# Patient Record
Sex: MALE | URBAN - METROPOLITAN AREA
[De-identification: names, ages, dates, MRNs, and addresses within clinical notes are randomized per-mention and may not be internally consistent; named-entity substitution may affect disease eponyms.]

---

## 2022-03-31 ENCOUNTER — OFFICE VISIT (OUTPATIENT)
Dept: URBAN - METROPOLITAN AREA CLINIC 56 | Facility: CLINIC | Age: 77
End: 2022-03-31
Payer: MEDICARE

## 2022-03-31 DIAGNOSIS — H25.13 AGE-RELATED NUCLEAR CATARACT, BILATERAL: Primary | ICD-10-CM

## 2022-03-31 DIAGNOSIS — H04.123 TEAR FILM INSUFFICIENCY OF BILATERAL LACRIMAL GLANDS: ICD-10-CM

## 2022-03-31 DIAGNOSIS — H17.823 PERIPHERAL OPACITY OF CORNEA, BILATERAL: ICD-10-CM

## 2022-03-31 PROCEDURE — 99204 OFFICE O/P NEW MOD 45 MIN: CPT | Performed by: OPHTHALMOLOGY

## 2022-03-31 PROCEDURE — 92134 CPTRZ OPH DX IMG PST SGM RTA: CPT | Performed by: OPHTHALMOLOGY

## 2022-03-31 ASSESSMENT — INTRAOCULAR PRESSURE
OS: 16
OD: 16

## 2022-03-31 ASSESSMENT — KERATOMETRY
OD: 43.37
OS: 43.33

## 2022-03-31 ASSESSMENT — VISUAL ACUITY
OD: 20/50
OS: 20/25

## 2022-03-31 NOTE — IMPRESSION/PLAN
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 Plan: Reviewed with patient, Patient will contact the office immediately  if notes any new  vision changes. 
See Cornea team

## 2022-03-31 NOTE — IMPRESSION/PLAN
Impression: Age-related nuclear cataract, bilateral: H25.13. Plan: Patient will continue to monitor vision. Patient will return for CEE or if notes any sudden changes in vision or loss of vision. 
See cornea team

## 2022-06-24 ENCOUNTER — OFFICE VISIT (OUTPATIENT)
Dept: URBAN - METROPOLITAN AREA CLINIC 24 | Facility: CLINIC | Age: 77
End: 2022-06-24
Payer: MEDICARE

## 2022-06-24 DIAGNOSIS — H25.13 AGE-RELATED NUCLEAR CATARACT, BILATERAL: ICD-10-CM

## 2022-06-24 DIAGNOSIS — H43.822 VITREOMACULAR TRACTION OF LT EYE: ICD-10-CM

## 2022-06-24 DIAGNOSIS — H18.423 BAND KERATOPATHY, BILATERAL: Primary | ICD-10-CM

## 2022-06-24 PROCEDURE — 92025 CPTRIZED CORNEAL TOPOGRAPHY: CPT | Performed by: OPHTHALMOLOGY

## 2022-06-24 PROCEDURE — 76514 ECHO EXAM OF EYE THICKNESS: CPT | Performed by: OPHTHALMOLOGY

## 2022-06-24 PROCEDURE — 99214 OFFICE O/P EST MOD 30 MIN: CPT | Performed by: OPHTHALMOLOGY

## 2022-06-24 RX ORDER — OFLOXACIN 3 MG/ML
0.3 % SOLUTION/ DROPS OPHTHALMIC
Qty: 5 | Refills: 1 | Status: ACTIVE
Start: 2022-06-24

## 2022-06-24 RX ORDER — PREDNISOLONE ACETATE 10 MG/ML
1 % SUSPENSION/ DROPS OPHTHALMIC
Qty: 5 | Refills: 1 | Status: ACTIVE
Start: 2022-06-24

## 2022-06-24 ASSESSMENT — INTRAOCULAR PRESSURE
OD: 18
OS: 22

## 2022-06-24 NOTE — IMPRESSION/PLAN
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  Plan: CTM  in general clinic, may limit vision after surgery

## 2022-06-24 NOTE — IMPRESSION/PLAN
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 Plan: Causing discomfort and decreased vision. I recommend EDTA chelation, a procedure which uses EDTA solution to dissolve the calcium deposits from the cornea. R/B/A/C discussed. Patient understands and would like to proceed with surgery.   Schedule EDTA chelation OD first, then OS, ERx sent

## 2022-06-24 NOTE — IMPRESSION/PLAN
Impression: Age-related nuclear cataract, bilateral: H25.13. Plan: ********Will address after EDTA chelation VMT OS - plus drops

## 2022-08-03 ENCOUNTER — OFFICE VISIT (OUTPATIENT)
Dept: URBAN - METROPOLITAN AREA CLINIC 44 | Facility: CLINIC | Age: 77
End: 2022-08-03
Payer: MEDICARE

## 2022-08-03 DIAGNOSIS — H18.423 BAND KERATOPATHY, BILATERAL: Primary | ICD-10-CM

## 2022-08-03 PROCEDURE — 65436 CURETTE/TREAT CORNEA: CPT | Performed by: OPHTHALMOLOGY

## 2022-08-03 RX ORDER — ACETAMINOPHEN AND CODEINE PHOSPHATE 300; 30 MG/1; MG/1
TABLET ORAL
Qty: 10 | Refills: 0 | Status: ACTIVE
Start: 2022-08-03

## 2022-08-08 ENCOUNTER — OFFICE VISIT (OUTPATIENT)
Dept: URBAN - METROPOLITAN AREA CLINIC 44 | Facility: CLINIC | Age: 77
End: 2022-08-08
Payer: MEDICARE

## 2022-08-08 DIAGNOSIS — H18.423 BAND KERATOPATHY, BILATERAL: Primary | ICD-10-CM

## 2022-08-08 DIAGNOSIS — H25.13 AGE-RELATED NUCLEAR CATARACT, BILATERAL: ICD-10-CM

## 2022-08-08 PROCEDURE — 99024 POSTOP FOLLOW-UP VISIT: CPT | Performed by: OPHTHALMOLOGY

## 2022-08-08 NOTE — IMPRESSION/PLAN
Impression: Band keratopathy, bilateral: Q89.054. Plan: s/p EDTA chelation OD, well healed, BCL removed. Decrease pred to BID x 1 week then d/c. D/c oflox. Cont lubrication. Will hold off on Chelation OS for now until after healed from cat sx OD.

## 2022-08-08 NOTE — IMPRESSION/PLAN
Impression: Age-related nuclear cataract, bilateral: H25.13. Plan: Discussed cataract diagnosis with the patient. Discussed risks, benefits and alternatives to surgery including but not limited to: bleeding, infection, risk of vision loss, loss of the eye, need for other surgery. Patient voiced understanding and wishes to proceed. Patient elects surgical treatment. Pt would like to proceed with cat Sx OD for now to improve vision before EDTA chelation and cat Sx OS. RTC for vat preop OD
********Will address OS after EDTA chelation, VMT OS - plus drops

## 2022-10-18 ENCOUNTER — OFFICE VISIT (OUTPATIENT)
Dept: URBAN - METROPOLITAN AREA CLINIC 56 | Facility: CLINIC | Age: 77
End: 2022-10-18
Payer: MEDICARE

## 2022-10-18 DIAGNOSIS — H18.423 BAND KERATOPATHY, BILATERAL: ICD-10-CM

## 2022-10-18 DIAGNOSIS — H25.13 AGE-RELATED NUCLEAR CATARACT, BILATERAL: Primary | ICD-10-CM

## 2022-10-18 DIAGNOSIS — H43.822 VITREOMACULAR ADHESION, LEFT EYE: ICD-10-CM

## 2022-10-18 PROCEDURE — 99204 OFFICE O/P NEW MOD 45 MIN: CPT | Performed by: STUDENT IN AN ORGANIZED HEALTH CARE EDUCATION/TRAINING PROGRAM

## 2022-10-18 PROCEDURE — 92134 CPTRZ OPH DX IMG PST SGM RTA: CPT | Performed by: STUDENT IN AN ORGANIZED HEALTH CARE EDUCATION/TRAINING PROGRAM

## 2022-10-18 ASSESSMENT — KERATOMETRY
OS: 43.37
OD: 43.49

## 2022-10-18 ASSESSMENT — INTRAOCULAR PRESSURE
OS: 20
OD: 20

## 2022-10-18 ASSESSMENT — VISUAL ACUITY
OS: 20/25
OD: 20/50

## 2022-10-18 NOTE — IMPRESSION/PLAN
Impression: Band keratopathy, bilateral: W00.416. Plan: s/p EDTA chelation OD, well healed. Cont lubrication. Will hold off on Chelation OS for now until after healed from cat sx OD.

## 2022-10-18 NOTE — IMPRESSION/PLAN
Impression: Age-related nuclear cataract, bilateral: H25.13. Plan: visually significant with expected improvement in vision with phaco/IOL. Discussed r/b/a of procedure. All surgical options discussed, including LensX vs conventional, and multifocal IOLs. Patient accepts full time glasses after surgery if conventional is elected, and glasses for NEAR if LensX/toric is elected. Multifocal options also discussed as most glasses-independent option. All questions answered. Refer patient for cataract pre-op. First Eye: OD Target: distance Dilation: good Habitual spec wear: bifocal 

(-) h/o trauma (-) h/o tamsulosin

## 2022-10-18 NOTE — IMPRESSION/PLAN
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 Plan: vision limitations PO. No holes/tears/detachments. RD precautions discussed. Monitor.

## 2022-11-11 ENCOUNTER — TESTING ONLY (OUTPATIENT)
Dept: URBAN - METROPOLITAN AREA CLINIC 56 | Facility: CLINIC | Age: 77
End: 2022-11-11
Payer: MEDICARE

## 2022-11-11 DIAGNOSIS — H25.13 AGE-RELATED NUCLEAR CATARACT, BILATERAL: ICD-10-CM

## 2022-11-11 DIAGNOSIS — Z01.818 ENCOUNTER FOR OTHER PREPROCEDURAL EXAMINATION: Primary | ICD-10-CM

## 2022-11-11 DIAGNOSIS — H18.423 BAND KERATOPATHY, BILATERAL: Primary | ICD-10-CM

## 2022-11-11 PROCEDURE — 92025 CPTRIZED CORNEAL TOPOGRAPHY: CPT | Performed by: OPHTHALMOLOGY

## 2022-11-11 PROCEDURE — 99203 OFFICE O/P NEW LOW 30 MIN: CPT | Performed by: PHYSICIAN ASSISTANT

## 2022-11-16 ENCOUNTER — PRE-OPERATIVE VISIT (OUTPATIENT)
Dept: URBAN - METROPOLITAN AREA CLINIC 10 | Facility: CLINIC | Age: 77
End: 2022-11-16
Payer: MEDICARE

## 2022-11-16 DIAGNOSIS — H04.123 TEAR FILM INSUFFICIENCY OF BILATERAL LACRIMAL GLANDS: ICD-10-CM

## 2022-11-16 DIAGNOSIS — H43.822 VITREOMACULAR ADHESION, LEFT EYE: ICD-10-CM

## 2022-11-16 DIAGNOSIS — H25.13 AGE-RELATED NUCLEAR CATARACT, BILATERAL: Primary | ICD-10-CM

## 2022-11-16 DIAGNOSIS — H18.423 BAND KERATOPATHY, BILATERAL: ICD-10-CM

## 2022-11-16 PROCEDURE — 99214 OFFICE O/P EST MOD 30 MIN: CPT | Performed by: OPHTHALMOLOGY

## 2022-11-16 ASSESSMENT — INTRAOCULAR PRESSURE
OS: 20
OD: 15

## 2022-11-16 NOTE — IMPRESSION/PLAN
Impression: Age-related nuclear cataract, bilateral: H25.13. Plan: Discussed cataract diagnosis with the patient. Risks and benefits of surgical treatment were discussed and understood. Patient elects surgical treatment. Patient is not a candidate for MFIOL given **BK**. Pt understands that vision will be limited by previous ocular disease and will still need to wear classes/CTLs for their best vision. The patient does not mind wearing glasses. Patient desires surgery OU, OD First.  TORIC OU, LENSx OU,  AIM= Distance OU, INJECTABLE OU(DEXYCU/DEXTENZA/TRIMOXI), and ORA OU. If pt elects toric, understands that rotation of the IOL is possible and may need additional surgery. ** repeat jimi today for Dr. Juan Diaz to review. ** Will address OS in the future after healed from EDTA chelation.

## 2022-11-16 NOTE — IMPRESSION/PLAN
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 Plan: vision limitations PO. No holes/tears/detachments. RD precautions discussed. Monitor.

## 2022-11-16 NOTE — IMPRESSION/PLAN
Impression: Band keratopathy, bilateral: G49.083. Plan: s/p EDTA chelation OD, well healed. Cont lubrication. Will hold off on Chelation OS for now until after healed from cat sx OD.

## 2022-11-22 ENCOUNTER — SURGERY (OUTPATIENT)
Dept: URBAN - METROPOLITAN AREA SURGERY 19 | Facility: SURGERY | Age: 77
End: 2022-11-22
Payer: MEDICARE

## 2022-11-22 DIAGNOSIS — H25.13 AGE-RELATED NUCLEAR CATARACT, BILATERAL: Primary | ICD-10-CM

## 2022-11-22 PROCEDURE — 66984 XCAPSL CTRC RMVL W/O ECP: CPT | Performed by: OPHTHALMOLOGY

## 2022-11-22 PROCEDURE — PR1CC PR1CC: CUSTOM | Performed by: OPHTHALMOLOGY

## 2022-11-23 ENCOUNTER — POST-OPERATIVE VISIT (OUTPATIENT)
Dept: URBAN - METROPOLITAN AREA CLINIC 56 | Facility: LOCATION | Age: 77
End: 2022-11-23
Payer: MEDICARE

## 2022-11-23 DIAGNOSIS — Z48.810 ENCOUNTER FOR SURGICAL AFTERCARE FOLLOWING SURGERY ON A SENSE ORGAN: Primary | ICD-10-CM

## 2022-11-23 ASSESSMENT — INTRAOCULAR PRESSURE: OD: 20

## 2022-11-23 NOTE — IMPRESSION/PLAN
Impression: S/P Cataract Extraction by phacoemulsification with IOL placement; ORA; Femto (LenSx) OD - 1 Day. Encounter for surgical aftercare following surgery on a sense organ  Z48.810. Excellent post op course   Post operative instructions reviewed - Plan: good IOP. Restrictions discussed. Call with worsening pain or vision.

## 2022-12-01 ENCOUNTER — POST-OPERATIVE VISIT (OUTPATIENT)
Dept: URBAN - METROPOLITAN AREA CLINIC 56 | Facility: LOCATION | Age: 77
End: 2022-12-01
Payer: MEDICARE

## 2022-12-01 DIAGNOSIS — Z48.810 ENCOUNTER FOR SURGICAL AFTERCARE FOLLOWING SURGERY ON A SENSE ORGAN: Primary | ICD-10-CM

## 2022-12-01 ASSESSMENT — INTRAOCULAR PRESSURE
OS: 18
OD: 18

## 2022-12-01 ASSESSMENT — VISUAL ACUITY
OD: 20/30
OS: 20/30

## 2022-12-01 NOTE — IMPRESSION/PLAN
Impression: S/P Cataract Extraction by phacoemulsification with IOL placement; ORA; Femto (LenSx) OD - 9 Days. Encounter for surgical aftercare following surgery on a sense organ  Z48.810. Excellent post op course   Post operative instructions reviewed - Plan: good IOP, no infection. Restrictions lifted. Call with worsening pain or vision. start pred BID weekly taper. Return as scheduled. Will determine if phaco OS based on pt vision/complaints next visit.

## 2022-12-29 ENCOUNTER — POST-OPERATIVE VISIT (OUTPATIENT)
Dept: URBAN - METROPOLITAN AREA CLINIC 56 | Facility: LOCATION | Age: 77
End: 2022-12-29
Payer: MEDICARE

## 2022-12-29 DIAGNOSIS — Z48.810 ENCOUNTER FOR SURGICAL AFTERCARE FOLLOWING SURGERY ON A SENSE ORGAN: Primary | ICD-10-CM

## 2022-12-29 PROCEDURE — 99024 POSTOP FOLLOW-UP VISIT: CPT

## 2022-12-29 PROCEDURE — 92015 DETERMINE REFRACTIVE STATE: CPT

## 2022-12-29 ASSESSMENT — INTRAOCULAR PRESSURE
OS: 18
OD: 18

## 2022-12-29 ASSESSMENT — VISUAL ACUITY
OS: 20/25
OD: 20/20

## 2022-12-29 NOTE — IMPRESSION/PLAN
Impression: S/P Cataract Extraction by phacoemulsification with IOL placement; ORA; Femto (LenSx) OD - 37 Days. Encounter for surgical aftercare following surgery on a sense organ  Z48.810. Plan: Pt doing well 1-month s/p CE sx. Pt ok to resume all normal activities. RTC 6 months or sooner PRN if vision changes or pain develops.